# Patient Record
(demographics unavailable — no encounter records)

---

## 2025-04-03 NOTE — HISTORY OF PRESENT ILLNESS
[de-identified] : h/o fever [FreeTextEntry6] :  Pt had fever x 72 hrs. Tm 100. + c/c, THAKUR   Was better past 24 hrs; today feels worse again. No fever documented today.